# Patient Record
Sex: FEMALE | Race: WHITE | NOT HISPANIC OR LATINO | Employment: OTHER | URBAN - METROPOLITAN AREA
[De-identification: names, ages, dates, MRNs, and addresses within clinical notes are randomized per-mention and may not be internally consistent; named-entity substitution may affect disease eponyms.]

---

## 2017-10-25 PROBLEM — G56.02 CARPAL TUNNEL SYNDROME ON LEFT: Status: ACTIVE | Noted: 2017-10-25

## 2017-12-26 PROBLEM — G56.01 CARPAL TUNNEL SYNDROME, RIGHT: Status: ACTIVE | Noted: 2017-12-26

## 2018-04-19 PROBLEM — Z92.29 HX OF LONG TERM USE OF BLOOD THINNERS: Status: ACTIVE | Noted: 2018-04-19

## 2018-04-19 PROBLEM — Z79.01 HX OF LONG TERM USE OF BLOOD THINNERS: Status: ACTIVE | Noted: 2018-04-19

## 2020-02-12 PROBLEM — J20.9 ACUTE BRONCHITIS: Status: ACTIVE | Noted: 2020-02-12

## 2020-02-12 PROBLEM — J10.1 INFLUENZA A WITH RESPIRATORY MANIFESTATIONS: Status: ACTIVE | Noted: 2020-02-12

## 2020-02-12 PROBLEM — E87.1 ACUTE HYPONATREMIA: Status: ACTIVE | Noted: 2020-02-12

## 2020-02-14 PROBLEM — J96.01 ACUTE HYPOXEMIC RESPIRATORY FAILURE: Status: ACTIVE | Noted: 2020-02-14

## 2021-01-12 ENCOUNTER — IMMUNIZATION (OUTPATIENT)
Dept: PHARMACY | Facility: CLINIC | Age: 86
End: 2021-01-12
Payer: MEDICARE

## 2021-01-12 DIAGNOSIS — Z23 NEED FOR VACCINATION: ICD-10-CM

## 2021-02-09 ENCOUNTER — IMMUNIZATION (OUTPATIENT)
Dept: PHARMACY | Facility: CLINIC | Age: 86
End: 2021-02-09
Payer: MEDICARE

## 2021-02-09 DIAGNOSIS — Z23 NEED FOR VACCINATION: Primary | ICD-10-CM

## 2021-05-07 PROBLEM — J44.9 CHRONIC OBSTRUCTIVE PULMONARY DISEASE: Status: ACTIVE | Noted: 2021-05-07

## 2023-07-03 ENCOUNTER — TELEPHONE (OUTPATIENT)
Dept: RHEUMATOLOGY | Facility: CLINIC | Age: 88
End: 2023-07-03
Payer: COMMERCIAL

## 2023-07-03 NOTE — TELEPHONE ENCOUNTER
----- Message from Ashley Gore sent at 6/30/2023  5:42 PM CDT -----  Contact: Pt's son/ Lisandro @ [phone]  Type:  Needs Medical Advice    Who Called: Pt's son/ Lisandro  Symptoms (please be specific): Arthritis pain in shoulder and back   How long has patient had these symptoms:  3 weeks    Would the patient rather a call back or a response via [facility]? Call pt's son  Best Call Back Number: [phone]  Additional Information: Pt's son would like a call back to schedule an appt for pt.

## 2023-07-10 ENCOUNTER — TELEPHONE (OUTPATIENT)
Dept: RHEUMATOLOGY | Facility: CLINIC | Age: 88
End: 2023-07-10
Payer: COMMERCIAL

## 2023-07-10 NOTE — TELEPHONE ENCOUNTER
----- Message from Nikki Akbarique Judd sent at 7/10/2023  3:09 PM CDT -----  Type:  Sooner Apoointment Request    Caller is requesting a sooner appointment.  Caller declined first available appointment listed below.  Caller will not accept being placed on the waitlist and is requesting a message be sent to doctor.  Name of Caller:Pt's son  When is the first available appointment?September  Would the patient rather a call back or a response via [facility]? Call  Best Call Back Number:[phone]  Additional Information: Pt's son has major concerns. He would like to get his mom in to be seen before September. He states she is one hundred and something pounds, isn't eating, and needs help before she is dead.

## 2023-07-10 NOTE — TELEPHONE ENCOUNTER
Called and spoke with patient's son and offered next available. Patient's son accepted earlier appt. He was given time date and location of appt. Patient's son was grateful for the call_DD

## 2023-07-12 ENCOUNTER — OFFICE VISIT (OUTPATIENT)
Dept: RHEUMATOLOGY | Facility: CLINIC | Age: 88
End: 2023-07-12
Payer: MEDICARE

## 2023-07-12 VITALS
WEIGHT: 112.63 LBS | BODY MASS INDEX: 19.96 KG/M2 | HEART RATE: 60 BPM | DIASTOLIC BLOOD PRESSURE: 57 MMHG | HEIGHT: 63 IN | SYSTOLIC BLOOD PRESSURE: 125 MMHG

## 2023-07-12 DIAGNOSIS — M25.50 POLYARTHRALGIA: Primary | ICD-10-CM

## 2023-07-12 PROCEDURE — 96372 THER/PROPH/DIAG INJ SC/IM: CPT | Mod: S$GLB,,, | Performed by: STUDENT IN AN ORGANIZED HEALTH CARE EDUCATION/TRAINING PROGRAM

## 2023-07-12 PROCEDURE — 99999 PR PBB SHADOW E&M-EST. PATIENT-LVL V: CPT | Mod: PBBFAC,,, | Performed by: STUDENT IN AN ORGANIZED HEALTH CARE EDUCATION/TRAINING PROGRAM

## 2023-07-12 PROCEDURE — 1101F PT FALLS ASSESS-DOCD LE1/YR: CPT | Mod: CPTII,S$GLB,, | Performed by: STUDENT IN AN ORGANIZED HEALTH CARE EDUCATION/TRAINING PROGRAM

## 2023-07-12 PROCEDURE — 96372 PR INJECTION,THERAP/PROPH/DIAG2ST, IM OR SUBCUT: ICD-10-PCS | Mod: S$GLB,,, | Performed by: STUDENT IN AN ORGANIZED HEALTH CARE EDUCATION/TRAINING PROGRAM

## 2023-07-12 PROCEDURE — 99204 PR OFFICE/OUTPT VISIT, NEW, LEVL IV, 45-59 MIN: ICD-10-PCS | Mod: 25,S$GLB,, | Performed by: STUDENT IN AN ORGANIZED HEALTH CARE EDUCATION/TRAINING PROGRAM

## 2023-07-12 PROCEDURE — 99999 PR PBB SHADOW E&M-EST. PATIENT-LVL V: ICD-10-PCS | Mod: PBBFAC,,, | Performed by: STUDENT IN AN ORGANIZED HEALTH CARE EDUCATION/TRAINING PROGRAM

## 2023-07-12 PROCEDURE — 3288F PR FALLS RISK ASSESSMENT DOCUMENTED: ICD-10-PCS | Mod: CPTII,S$GLB,, | Performed by: STUDENT IN AN ORGANIZED HEALTH CARE EDUCATION/TRAINING PROGRAM

## 2023-07-12 PROCEDURE — 1159F MED LIST DOCD IN RCRD: CPT | Mod: CPTII,S$GLB,, | Performed by: STUDENT IN AN ORGANIZED HEALTH CARE EDUCATION/TRAINING PROGRAM

## 2023-07-12 PROCEDURE — 1101F PR PT FALLS ASSESS DOC 0-1 FALLS W/OUT INJ PAST YR: ICD-10-PCS | Mod: CPTII,S$GLB,, | Performed by: STUDENT IN AN ORGANIZED HEALTH CARE EDUCATION/TRAINING PROGRAM

## 2023-07-12 PROCEDURE — 3288F FALL RISK ASSESSMENT DOCD: CPT | Mod: CPTII,S$GLB,, | Performed by: STUDENT IN AN ORGANIZED HEALTH CARE EDUCATION/TRAINING PROGRAM

## 2023-07-12 PROCEDURE — 1159F PR MEDICATION LIST DOCUMENTED IN MEDICAL RECORD: ICD-10-PCS | Mod: CPTII,S$GLB,, | Performed by: STUDENT IN AN ORGANIZED HEALTH CARE EDUCATION/TRAINING PROGRAM

## 2023-07-12 PROCEDURE — 1125F PR PAIN SEVERITY QUANTIFIED, PAIN PRESENT: ICD-10-PCS | Mod: CPTII,S$GLB,, | Performed by: STUDENT IN AN ORGANIZED HEALTH CARE EDUCATION/TRAINING PROGRAM

## 2023-07-12 PROCEDURE — 1160F RVW MEDS BY RX/DR IN RCRD: CPT | Mod: CPTII,S$GLB,, | Performed by: STUDENT IN AN ORGANIZED HEALTH CARE EDUCATION/TRAINING PROGRAM

## 2023-07-12 PROCEDURE — 99204 OFFICE O/P NEW MOD 45 MIN: CPT | Mod: 25,S$GLB,, | Performed by: STUDENT IN AN ORGANIZED HEALTH CARE EDUCATION/TRAINING PROGRAM

## 2023-07-12 PROCEDURE — 1125F AMNT PAIN NOTED PAIN PRSNT: CPT | Mod: CPTII,S$GLB,, | Performed by: STUDENT IN AN ORGANIZED HEALTH CARE EDUCATION/TRAINING PROGRAM

## 2023-07-12 PROCEDURE — 1160F PR REVIEW ALL MEDS BY PRESCRIBER/CLIN PHARMACIST DOCUMENTED: ICD-10-PCS | Mod: CPTII,S$GLB,, | Performed by: STUDENT IN AN ORGANIZED HEALTH CARE EDUCATION/TRAINING PROGRAM

## 2023-07-12 RX ORDER — NITROGLYCERIN 0.4 MG/1
TABLET SUBLINGUAL
COMMUNITY

## 2023-07-12 RX ORDER — CONTAINER,EMPTY
EACH MISCELLANEOUS
COMMUNITY
Start: 2023-03-28

## 2023-07-12 RX ORDER — CONJUGATED ESTROGENS 0.62 MG/G
CREAM VAGINAL
COMMUNITY

## 2023-07-12 RX ORDER — MEGESTROL ACETATE 20 MG/1
20 TABLET ORAL
COMMUNITY
Start: 2023-05-05 | End: 2024-05-04

## 2023-07-12 RX ORDER — TOLTERODINE 2 MG/1
2 CAPSULE, EXTENDED RELEASE ORAL EVERY MORNING
COMMUNITY
Start: 2023-05-04

## 2023-07-12 RX ORDER — ERGOCALCIFEROL 1.25 MG/1
50000 CAPSULE ORAL
COMMUNITY
Start: 2023-02-23

## 2023-07-12 RX ORDER — PHENAZOPYRIDINE HYDROCHLORIDE 200 MG/1
200 TABLET, FILM COATED ORAL
COMMUNITY

## 2023-07-12 RX ORDER — FOLIC ACID 1 MG/1
1 TABLET ORAL EVERY MORNING
COMMUNITY
Start: 2023-01-30 | End: 2024-01-30

## 2023-07-12 RX ORDER — ATORVASTATIN CALCIUM 80 MG/1
1 TABLET, FILM COATED ORAL EVERY MORNING
COMMUNITY
Start: 2023-04-27

## 2023-07-12 RX ORDER — ONDANSETRON 4 MG/1
4 TABLET, ORALLY DISINTEGRATING ORAL EVERY 8 HOURS PRN
COMMUNITY
Start: 2023-03-28

## 2023-07-12 RX ORDER — KETOROLAC TROMETHAMINE 30 MG/ML
30 INJECTION, SOLUTION INTRAMUSCULAR; INTRAVENOUS
Status: COMPLETED | OUTPATIENT
Start: 2023-07-12 | End: 2023-07-12

## 2023-07-12 RX ORDER — PREDNISONE 5 MG/1
TABLET ORAL
Qty: 21 TABLET | Refills: 0 | Status: SHIPPED | OUTPATIENT
Start: 2023-07-12 | End: 2023-08-09

## 2023-07-12 RX ORDER — PREDNISONE 10 MG/1
10 TABLET ORAL EVERY MORNING
COMMUNITY
Start: 2023-06-06

## 2023-07-12 RX ORDER — TRAMADOL HYDROCHLORIDE AND ACETAMINOPHEN 37.5; 325 MG/1; MG/1
1 TABLET, FILM COATED ORAL 2 TIMES DAILY PRN
COMMUNITY
Start: 2023-06-29

## 2023-07-12 RX ADMIN — KETOROLAC TROMETHAMINE 30 MG: 30 INJECTION, SOLUTION INTRAMUSCULAR; INTRAVENOUS at 09:07

## 2023-07-12 NOTE — PROGRESS NOTES
Administered 1 cc  Toradol 30mg/cc  to left ventrogluteal. Pt tolerated well. No acute reaction noted to site. Pt instructed on S/S to report. Advised patient to wait in lobby 15 minutes after receiving injection to monitor for any reactions. Pt verbalized understanding.     Lot: [id number]  Exp: 04/30/2024        Side effects: anaphylaxis, diaphoresis (sweating), injection site reaction/pain, headache, hypertension, ecchymosis (bruising), constipation, abdominal pain.

## 2023-07-18 NOTE — PROGRESS NOTES
RHEUMATOLOGY CLINIC INITIAL VISIT    Reason for consult:- joint pains    Chief complaints, HPI, ROS, EXAM, Assessment & Plans:-    Isra Magallanes is a 87 y.o. pleasant female who presents to be evaluated for joint pains.  Patient states she has history of progressively worsening joint pain however recently has become worse.  Notes that she was diagnosed recently with UTI which has been treated.  Feels this made her pains worse.  She is had pain in her shoulders, knees, back especially.  She was given a course of prednisone which did help a good bit.  Was also given tramadol but did not find that this was very helpful.  Previously took Celebrex but had adverse reaction.  Rheumatologic review of systems otherwise negative.No evidence of synovitis, dactylitis or enthesitis.  She has bony hypertrophy of her interphalangeal joints and CMC squaring.  Pain with range of motion of shoulders.    Reviewed all available old and outside pertinent medical records.    All lab results personally reviewed and interpreted by me.    1. Polyarthralgia        Problem List Items Addressed This Visit    None  Visit Diagnoses       Polyarthralgia    -  Primary    Relevant Medications    predniSONE (DELTASONE) 5 MG tablet    ketorolac injection 30 mg (Completed)    Other Relevant Orders    Ambulatory referral/consult to Physical/Occupational Therapy            Patient presenting to be evaluated for joint pains recently worsening after urinary tract infection  May be postinfectious flare  She did have improvement with course of prednisone  Low suspicion for inflammatory arthritis  Will provide injection of ketorolac today and short course of continued prednisone  Recommend physical therapy for her shoulder and back and will place referral  Encouraged to take up to 3000 mg of Tylenol daily, try turmeric supplement, use Voltaren gel on painful joints up to 4 times daily    # Follow up in about 6 months (around 1/12/2024).    Chronic comorbid  conditions affecting medical decision making today:    Past Medical History:   Diagnosis Date    Anxiety     ARMD (age related macular degeneration)     Arthritis     CAD (coronary artery disease) of artery bypass graft     Diabetes mellitus, type 2     GERD (gastroesophageal reflux disease)     H/O bronchiolitis     History of UTI     Hormone replacement therapy (HRT)     Hyperlipidemia     Hypertension     Macular degeneration     Pneumonia     Rheumatoid arthritis        Past Surgical History:   Procedure Laterality Date    APPENDECTOMY      BLADDER SURGERY      CARDIAC SURGERY      CARPAL TUNNEL RELEASE Left 10/25/2017    Left CTR    CARPAL TUNNEL RELEASE Right 12/26/2017    Right CTR    CORONARY ARTERY BYPASS GRAFT      4 vessels    DILATION AND CURETTAGE OF UTERUS      x 4    FOOT SURGERY Bilateral     HYSTERECTOMY      NOSE SURGERY      SKIN BIOPSY      SKIN LESION EXCISION Left     hand and nose melanoma    TONSILLECTOMY          Social History     Tobacco Use    Smoking status: Light Smoker     Types: Cigarettes    Smokeless tobacco: Former     Types: Chew   Substance Use Topics    Alcohol use: No    Drug use: No       Family History   Problem Relation Age of Onset    Macular degeneration Mother     Diabetes Maternal Grandmother     Diabetes Paternal Grandmother        Review of patient's allergies indicates:   Allergen Reactions    Celebrex [celecoxib] Shortness Of Breath and Swelling    Sulfa (sulfonamide antibiotics) Shortness Of Breath and Swelling       Medication List with Changes/Refills   New Medications    PREDNISONE (DELTASONE) 5 MG TABLET    Take 1 tablet (5 mg total) by mouth once daily for 14 days, THEN 0.5 tablets (2.5 mg total) once daily for 14 days.   Current Medications    ALBUTEROL (ACCUNEB) 1.25 MG/3 ML NEBU    Take 3 mLs (1.25 mg total) by nebulization 3 (three) times daily as needed (wheezing). Rescue    ASPIRIN (ECOTRIN) 81 MG EC TABLET    Take 81 mg by mouth once daily.     ATORVASTATIN (LIPITOR) 80 MG TABLET    Take 1 tablet by mouth every morning.    BLOOD SUGAR DIAGNOSTIC STRP    To check BG 2 times daily, to use with insurance preferred meter    BLOOD-GLUCOSE METER KIT    To check BG 2 times daily, to use with insurance preferred meter    BUDESONIDE-FORMOTEROL 160-4.5 MCG (SYMBICORT) 160-4.5 MCG/ACTUATION HFAA    Inhale 2 puffs into the lungs 2 (two) times daily. Controller    CARVEDILOL (COREG) 12.5 MG TABLET    TAKE 1 TABLET DAILY    CLOPIDOGREL (PLAVIX) 75 MG TABLET    TAKE 1 TABLET DAILY    CONJUGATED ESTROGENS (PREMARIN) VAGINAL CREAM    PLACE VAGINALLY DAILY    DOCUSATE CALCIUM (STOOL SOFTENER ORAL)    Take 1 tablet by mouth Daily.     EMPTY CONTAINER (SHARPS CONTAINER) MISC    For sharps disposal    ERGOCALCIFEROL (ERGOCALCIFEROL) 50,000 UNIT CAP    Take 50,000 Units by mouth.    FENOFIBRIC ACID (FIBRICOR) 135 MG CPDR    TAKE 1 CAPSULE NIGHTLY    FLUTICASONE PROPIONATE (FLONASE) 50 MCG/ACTUATION NASAL SPRAY    2 sprays (100 mcg total) by Each Nostril route daily as needed for Allergies.    FOLIC ACID (FOLVITE) 1 MG TABLET    Take 1 tablet by mouth every morning.    LANCETS MISC    To check BG 2 times daily, to use with insurance preferred meter    LEVOCETIRIZINE (XYZAL) 5 MG TABLET    Take 1 tablet (5 mg total) by mouth nightly as needed for Allergies. 5 mg po q hs prn post nasal drainage    LISINOPRIL 10 MG TABLET    TAKE 1 TABLET DAILY EVERY EVENING FOR BLOOD PRESSURE AND HEART    LORAZEPAM (ATIVAN) 0.5 MG TABLET    1 po q 12 hrs prn anxiety    MECLIZINE (ANTIVERT) 12.5 MG TABLET    1-2 po q 8 hrs PRN vertigio    MEGESTROL (MEGACE) 20 MG TAB    Take 20 mg by mouth.    METFORMIN (GLUCOPHAGE) 500 MG TABLET    Take 1 tablet (500 mg total) by mouth daily with breakfast.    NITROGLYCERIN (NITROSTAT) 0.4 MG SL TABLET    nitroglycerin 0.4 mg sublingual tablet    OMEPRAZOLE (PRILOSEC) 20 MG CAPSULE    TAKE 1 CAPSULE DAILY FOR REFLUX    ONDANSETRON (ZOFRAN-ODT) 4 MG TBDL    4 mg  every 8 (eight) hours as needed.    PHENAZOPYRIDINE (PYRIDIUM) 200 MG TABLET    Take 200 mg by mouth.    PREDNISONE (DELTASONE) 10 MG TABLET    Take 10 mg by mouth every morning.    TOLTERODINE (DETROL LA) 2 MG CP24    Take 2 mg by mouth every morning.    TRAMADOL-ACETAMINOPHEN 37.5-325 MG (ULTRACET) 37.5-325 MG TAB    Take 1 tablet by mouth 2 (two) times daily as needed.   Discontinued Medications    ATORVASTATIN (LIPITOR) 40 MG TABLET    TAKE 1 TABLET DAILY         Disclaimer: This note was prepared using voice recognition system and is likely to have sound alike errors and is not proofread.  Please message me with any questions.    45 minutes of total time spent on the encounter, which includes face to face time and non-face to face time preparing to see the patient (eg, review of tests), Obtaining and/or reviewing separately obtained history, Documenting clinical information in the electronic or other health record, Independently interpreting results (not separately reported) and communicating results to the patient/family/caregiver, or Care coordination (not separately reported).     Thank you for allowing me to participate in the care of Isra Magallanes.    Ernst Bonds MD

## 2025-03-25 ENCOUNTER — OFFICE VISIT (OUTPATIENT)
Dept: ENDOCRINOLOGY | Facility: CLINIC | Age: 89
End: 2025-03-25
Payer: COMMERCIAL

## 2025-03-25 VITALS
SYSTOLIC BLOOD PRESSURE: 118 MMHG | HEART RATE: 81 BPM | RESPIRATION RATE: 18 BRPM | WEIGHT: 130.38 LBS | HEIGHT: 63 IN | BODY MASS INDEX: 23.1 KG/M2 | DIASTOLIC BLOOD PRESSURE: 62 MMHG | OXYGEN SATURATION: 98 %

## 2025-03-25 DIAGNOSIS — E11.8 TYPE 2 DIABETES MELLITUS WITH COMPLICATION, WITHOUT LONG-TERM CURRENT USE OF INSULIN: ICD-10-CM

## 2025-03-25 DIAGNOSIS — E55.9 HYPOVITAMINOSIS D: ICD-10-CM

## 2025-03-25 DIAGNOSIS — M81.0 OSTEOPOROSIS, UNSPECIFIED OSTEOPOROSIS TYPE, UNSPECIFIED PATHOLOGICAL FRACTURE PRESENCE: Primary | ICD-10-CM

## 2025-03-25 PROCEDURE — 1159F MED LIST DOCD IN RCRD: CPT | Mod: CPTII,S$GLB,, | Performed by: PHYSICIAN ASSISTANT

## 2025-03-25 PROCEDURE — 99204 OFFICE O/P NEW MOD 45 MIN: CPT | Mod: S$GLB,,, | Performed by: PHYSICIAN ASSISTANT

## 2025-03-25 PROCEDURE — 1100F PTFALLS ASSESS-DOCD GE2>/YR: CPT | Mod: CPTII,S$GLB,, | Performed by: PHYSICIAN ASSISTANT

## 2025-03-25 PROCEDURE — G2211 COMPLEX E/M VISIT ADD ON: HCPCS | Mod: S$GLB,,, | Performed by: PHYSICIAN ASSISTANT

## 2025-03-25 PROCEDURE — 1125F AMNT PAIN NOTED PAIN PRSNT: CPT | Mod: CPTII,S$GLB,, | Performed by: PHYSICIAN ASSISTANT

## 2025-03-25 PROCEDURE — 99999 PR PBB SHADOW E&M-EST. PATIENT-LVL V: CPT | Mod: PBBFAC,,, | Performed by: PHYSICIAN ASSISTANT

## 2025-03-25 PROCEDURE — 3288F FALL RISK ASSESSMENT DOCD: CPT | Mod: CPTII,S$GLB,, | Performed by: PHYSICIAN ASSISTANT

## 2025-03-25 PROCEDURE — 1160F RVW MEDS BY RX/DR IN RCRD: CPT | Mod: CPTII,S$GLB,, | Performed by: PHYSICIAN ASSISTANT

## 2025-03-25 RX ORDER — HYDROCODONE BITARTRATE AND ACETAMINOPHEN 5; 325 MG/1; MG/1
1 TABLET ORAL 2 TIMES DAILY PRN
COMMUNITY
Start: 2025-03-07

## 2025-03-25 RX ORDER — FAMOTIDINE 20 MG/1
20 TABLET, FILM COATED ORAL NIGHTLY PRN
COMMUNITY
Start: 2024-08-05

## 2025-03-25 RX ORDER — MIRTAZAPINE 7.5 MG/1
1 TABLET, FILM COATED ORAL NIGHTLY
COMMUNITY
Start: 2025-02-06

## 2025-03-25 RX ORDER — GABAPENTIN 100 MG/1
100 CAPSULE ORAL
COMMUNITY
Start: 2025-03-07

## 2025-03-25 RX ORDER — TERIPARATIDE 250 UG/ML
INJECTION, SOLUTION SUBCUTANEOUS
Qty: 2.4 ML | Refills: 11 | Status: ACTIVE | OUTPATIENT
Start: 2025-03-25

## 2025-03-25 NOTE — PROGRESS NOTES
"CC: Osteoporosis    HPI: Isra Magallaens is a 89 y.o. female here for osteoporosis along with pending conditions listed in the Visit Diagnosis. Diagnosed 2 years ago. New to endocrine. Prescribed fosamax ~2 years ago but only took ~8 mths. She thinks her mother may have had osteoporosis. She had a compression fracture of L3 in 2/25. Mutiple falls without injury. No recent steriod injections. Not taking ca or vd. She is having a kyphoplasty in a few days of L3 & L4.     T2DM  Diet controlled  Eye exam yearly-Macular Degeneration    PMHx, PSHx: reviewed in epic.  Social Hx: no ETOH. Smokes 1 ppd.     Wt Readings from Last 10 Encounters:   03/25/25 59.1 kg (130 lb 6.4 oz)   07/12/23 51.1 kg (112 lb 10.5 oz)   01/24/22 60 kg (132 lb 3.2 oz)   11/01/21 61.2 kg (134 lb 14.4 oz)   10/04/21 62.3 kg (137 lb 6.4 oz)   08/16/21 63.1 kg (139 lb 3.2 oz)   05/07/21 61.7 kg (136 lb)   04/09/21 60.3 kg (133 lb)   03/26/21 60.3 kg (133 lb)   03/01/21 61.2 kg (135 lb)      ROS:   Constitutional: No recent significant weight change  Eyes: No recent visual changes  Cardiovascular: Denies current anginal symptoms  Respiratory: Denies current respiratory difficulty  Gastrointestinal: Denies recent bowel disturbances  GenitoUrinary - No dysuria  Skin: No new skin rash  Neurologic: No focal neurologic complaints  Musculoskeletal: no joint pain  Endocrine: no polyphagia, polydipsia or polyuria  Remainder ROS negative       /62 (BP Location: Left arm, Patient Position: Sitting)   Pulse 81   Resp 18   Ht 5' 3" (1.6 m)   Wt 59.1 kg (130 lb 6.4 oz)   SpO2 98%   BMI 23.10 kg/m²      Personally reviewed labs below:    Lab Results   Component Value Date    TSH 2.8 12/07/2022        Chemistry        Component Value Date/Time     (L) 10/15/2023 0621     11/01/2021 1007    K 3.7 10/15/2023 0621    K 4.5 11/01/2021 1007     10/15/2023 0621    CL 98 11/01/2021 1007    CO2 22 10/15/2023 0621    CO2 28 11/01/2021 1007    BUN " 5 10/15/2023 0621    BUN 24 (H) 11/01/2021 1007    CREATININE 0.58 10/15/2023 0621    CREATININE 0.94 11/01/2021 1007     (H) 11/01/2021 1007        Component Value Date/Time    CALCIUM 7.6 (L) 10/15/2023 0621    CALCIUM 10.4 (H) 11/01/2021 1007    ALKPHOS 55 11/01/2021 1007    AST 25 11/01/2021 1007    AST 22 01/28/2016 0820    ALT 13 11/01/2021 1007    BILITOT 0.3 11/01/2021 1007    ESTGFRAFRICA 88 10/15/2023 0621    ESTGFRAFRICA >60 11/01/2021 1007    EGFRNONAA 55 (A) 11/01/2021 1007           Lab Results   Component Value Date    HGBA1C 5.9 10/15/2023    HGBA1C 6.1 (H) 04/19/2022    HGBA1C 6.4 (H) 11/01/2021        PE:  GENERAL: Well developed, well nourished  RESPIRATORY: Normal effort,   NEURO: steady gait, CN ll-Xll grossly intact  PSYCH: normal mood and affect    Assessment/Plan:   1. Osteoporosis, unspecified osteoporosis type, unspecified pathological fracture presence  teriparatide (FORTEO) 20 mcg/dose (600mcg/2.4mL) PnIj    Calcium, Ionized    PTH, Intact    Comprehensive Metabolic Panel    Hemoglobin A1C    Lipid Panel    Microalbumin/Creatinine Ratio, Urine    TSH    T4, Free    Vitamin D    Protein Electrophoresis, Serum    Protein Electrophoresis, Random Urine    Immunoglobulin Free LT Chains Blood    Comprehensive Metabolic Panel    Calcium, Ionized    PTH, Intact    Comprehensive Metabolic Panel    Hemoglobin A1C    Lipid Panel    Microalbumin/Creatinine Ratio, Urine    TSH    T4, Free    Vitamin D    Protein Electrophoresis, Serum    Protein Electrophoresis, Random Urine    Immunoglobulin Free LT Chains Blood    Comprehensive Metabolic Panel      2. Type 2 diabetes mellitus with complication, without long-term current use of insulin  Hemoglobin A1C    Lipid Panel    Microalbumin/Creatinine Ratio, Urine    TSH    T4, Free    Hemoglobin A1C    Lipid Panel    Microalbumin/Creatinine Ratio, Urine    TSH    T4, Free      3. Hypovitaminosis D  Vitamin D    Vitamin D    Vitamin D    Vitamin D          Osteoporosis  Pt having a kyphoplasty in a few days.  Check for secondary causes of osteoporosis  Start wt bearing/resistance exercises as tolerated after sx.  Start forteo 20 mcg qd  Start 1500 mg of calcium and 2000 IU of vd.  Repeat dexa scan 2/27.    T2DM  Diet controlled  Check labs.  F/u w/ PCP.    Hypovitaminosis d  -check vd    Followup  Pth, ica, cmp, A1c, lp, vd, tfts, mac, flc, spep, upep  F/u in 6 mths -cmp, vd

## 2025-03-26 PROBLEM — M81.0 AGE RELATED OSTEOPOROSIS: Status: ACTIVE | Noted: 2025-03-26

## 2025-04-10 ENCOUNTER — TELEPHONE (OUTPATIENT)
Dept: ENDOCRINOLOGY | Facility: CLINIC | Age: 89
End: 2025-04-10
Payer: COMMERCIAL

## 2025-04-10 NOTE — TELEPHONE ENCOUNTER
Discussed se of forteo. Pt concerned about risk of cancer. Advised to take for 4-6 mths then can switch to a bisphosphonate. Advised to take before bed to decrease se.

## 2025-04-10 NOTE — TELEPHONE ENCOUNTER
----- Message from Med Assistant Perri sent at 4/10/2025  1:30 PM CDT -----  Please call pt  ----- Message -----  From: Osvaldo Georges  Sent: 4/10/2025   1:24 PM CDT  To: [facility]arn[facility]tazar Staff    Type:  Needs Medical AdviceWho Called: duarte daughterWould the patient rather a call back or a response via [facility]? Call [facility] Call Back Number: [phone] Additional Information: pt has concerns regarding meds she is taking. Pt have concerns if she want to take the meds. She have question regarding teriparatide (FORTEO) 20 mcg/dose (600mcg/2.4mL) PnIj. She read pw & have some concerns this meds/ pt want smeone to explain this med to her. please call to discuss